# Patient Record
Sex: MALE | Race: WHITE | Employment: FULL TIME | URBAN - NONMETROPOLITAN AREA
[De-identification: names, ages, dates, MRNs, and addresses within clinical notes are randomized per-mention and may not be internally consistent; named-entity substitution may affect disease eponyms.]

---

## 2020-06-22 ENCOUNTER — HOSPITAL ENCOUNTER (EMERGENCY)
Age: 35
Discharge: HOME | End: 2020-06-22
Payer: COMMERCIAL

## 2020-06-22 VITALS
RESPIRATION RATE: 14 BRPM | DIASTOLIC BLOOD PRESSURE: 90 MMHG | WEIGHT: 160 LBS | HEART RATE: 71 BPM | SYSTOLIC BLOOD PRESSURE: 130 MMHG | HEIGHT: 73 IN | TEMPERATURE: 98.1 F | OXYGEN SATURATION: 96 % | BODY MASS INDEX: 21.2 KG/M2

## 2020-06-22 PROCEDURE — 99282 EMERGENCY DEPT VISIT SF MDM: CPT

## 2020-06-22 PROCEDURE — 96374 THER/PROPH/DIAG INJ IV PUSH: CPT

## 2020-06-22 PROCEDURE — 2500000003 HC RX 250 WO HCPCS: Performed by: PHYSICIAN ASSISTANT

## 2020-06-22 RX ADMIN — GLUCAGON HYDROCHLORIDE 1 MG: KIT at 21:42

## 2020-06-22 SDOH — HEALTH STABILITY: MENTAL HEALTH: HOW OFTEN DO YOU HAVE A DRINK CONTAINING ALCOHOL?: NEVER

## 2020-06-22 ASSESSMENT — ENCOUNTER SYMPTOMS
FACIAL SWELLING: 0
SHORTNESS OF BREATH: 0
TROUBLE SWALLOWING: 1
RHINORRHEA: 0
ABDOMINAL PAIN: 0
PHOTOPHOBIA: 0
COUGH: 0
VOMITING: 1
SORE THROAT: 0
VOICE CHANGE: 0
NAUSEA: 0

## 2020-06-22 ASSESSMENT — PAIN DESCRIPTION - PAIN TYPE: TYPE: ACUTE PAIN

## 2020-06-22 ASSESSMENT — PAIN DESCRIPTION - LOCATION: LOCATION: THROAT

## 2020-06-22 ASSESSMENT — PAIN SCALES - GENERAL: PAINLEVEL_OUTOF10: 3

## 2020-06-23 NOTE — ED NOTES
Patient vomiting at this time, states he \"may have gotten the food out\".       Douglas Richardson RN  06/22/20 9040

## 2020-06-23 NOTE — ED NOTES
Patient medicated per MAR at this time. Patient states he is still nauseous and vomiting. VSS. Will continue to monitor.       Lisbeth Heard RN  06/22/20 0472

## 2020-06-23 NOTE — ED PROVIDER NOTES
[facility] EMERGENCY DEPT      CHIEF COMPLAINT       Chief Complaint   Patient presents with    Other     food stuck in throat       Nurses Notes reviewed and I agree except as noted in the HPI. HISTORY OF PRESENT ILLNESS    Eddie Hollingsworth is a 38 y.o. male who presents for food stuck in his throat. Patient was eating ribs tonight when he felt like a piece was caught in his throat. He states that this has happened before but that it usually resolves after drinking water. Tonight the patient tried to drink water but experienced emesis immediately following. He is also unable to swallow his own spit. He denies chest pain or shortness of breath. Current pain is 3/10 in severity. No further complaints at initial encounter. Location/Symptom: food stuck in throat   Timing/Onset: PTA   Context/Setting: eating ribs   Modifying Factors: emesis, cannot swallow spit   Severity: 3/10    REVIEW OF SYSTEMS     Review of Systems   Constitutional: Negative for chills, fatigue and fever. HENT: Positive for trouble swallowing. Negative for congestion, drooling, ear pain, facial swelling, rhinorrhea, sore throat and voice change. Eyes: Negative for photophobia. Respiratory: Negative for cough and shortness of breath. Cardiovascular: Negative for chest pain. Gastrointestinal: Positive for vomiting. Negative for abdominal pain and nausea. Endocrine: Negative for polyuria. Genitourinary: Negative for decreased urine volume, difficulty urinating and dysuria. Musculoskeletal: Negative for myalgias and neck stiffness. Skin: Negative for rash. Neurological: Negative for dizziness, weakness and headaches. Hematological: Negative for adenopathy. Psychiatric/Behavioral: Negative for confusion and sleep disturbance. PAST MEDICAL HISTORY    has no past medical history on file. SURGICAL HISTORY      has no past surgical history on file.     CURRENT MEDICATIONS       Discharge Medication List as of mis-transcribed.)    Scribe:  Chelsey Lovett  6/22/20 10:12 PM EDT Scribing for and in the presence of Tasia Baumgarten, PA-C. Signed by: Pito Baker, 06/23/20 1:11 PM    Provider:  I personally performed the services described in the documentation, reviewed and edited the documentation which was dictated to the scribe in my presence, and it accurately records my words and actions.     Tasia Baumgarten, PA-C 06/23/20 1:11 PM    Tasia Baumgarten, PA-C Tasia Baumgarten, [address]  06/23/20 1316

## 2020-07-15 ENCOUNTER — NURSE ONLY (OUTPATIENT)
Dept: LAB | Age: 35
End: 2020-07-15